# Patient Record
Sex: MALE | Race: WHITE | NOT HISPANIC OR LATINO | URBAN - METROPOLITAN AREA
[De-identification: names, ages, dates, MRNs, and addresses within clinical notes are randomized per-mention and may not be internally consistent; named-entity substitution may affect disease eponyms.]

---

## 2023-08-29 ENCOUNTER — OFFICE VISIT (OUTPATIENT)
Dept: URGENT CARE | Facility: CLINIC | Age: 48
End: 2023-08-29

## 2023-08-29 VITALS
RESPIRATION RATE: 18 BRPM | SYSTOLIC BLOOD PRESSURE: 124 MMHG | DIASTOLIC BLOOD PRESSURE: 71 MMHG | BODY MASS INDEX: 29.03 KG/M2 | HEART RATE: 79 BPM | OXYGEN SATURATION: 97 % | HEIGHT: 67 IN | WEIGHT: 185 LBS | TEMPERATURE: 99 F

## 2023-08-29 DIAGNOSIS — S29.012A MUSCLE STRAIN OF RIGHT UPPER BACK, INITIAL ENCOUNTER: Primary | ICD-10-CM

## 2023-08-29 DIAGNOSIS — M54.10 RADICULOPATHY, UNSPECIFIED SPINAL REGION: ICD-10-CM

## 2023-08-29 PROCEDURE — 96372 PR INJECTION,THERAP/PROPH/DIAG2ST, IM OR SUBCUT: ICD-10-PCS | Mod: TIER,S$GLB,,

## 2023-08-29 PROCEDURE — 99204 PR OFFICE/OUTPT VISIT, NEW, LEVL IV, 45-59 MIN: ICD-10-PCS | Mod: TIER,25,S$GLB,

## 2023-08-29 PROCEDURE — 96372 THER/PROPH/DIAG INJ SC/IM: CPT | Mod: TIER,S$GLB,,

## 2023-08-29 PROCEDURE — 99204 OFFICE O/P NEW MOD 45 MIN: CPT | Mod: TIER,25,S$GLB,

## 2023-08-29 PROCEDURE — 72040 X-RAY EXAM NECK SPINE 2-3 VW: CPT | Mod: TIER,S$GLB,, | Performed by: RADIOLOGY

## 2023-08-29 PROCEDURE — 72040 XR CERVICAL SPINE 2 OR 3 VIEWS: ICD-10-PCS | Mod: TIER,S$GLB,, | Performed by: RADIOLOGY

## 2023-08-29 RX ORDER — KETOROLAC TROMETHAMINE 30 MG/ML
30 INJECTION, SOLUTION INTRAMUSCULAR; INTRAVENOUS
Status: COMPLETED | OUTPATIENT
Start: 2023-08-29 | End: 2023-08-29

## 2023-08-29 RX ORDER — CYCLOBENZAPRINE HCL 10 MG
10 TABLET ORAL 3 TIMES DAILY PRN
Qty: 28 TABLET | Refills: 0 | Status: SHIPPED | OUTPATIENT
Start: 2023-08-29 | End: 2023-09-08

## 2023-08-29 RX ADMIN — KETOROLAC TROMETHAMINE 30 MG: 30 INJECTION, SOLUTION INTRAMUSCULAR; INTRAVENOUS at 12:08

## 2023-08-29 NOTE — PROGRESS NOTES
"Subjective:      Patient ID: Keon Rowland is a 48 y.o. male.    Vitals:  height is 5' 6.5" (1.689 m) and weight is 83.9 kg (185 lb). His oral temperature is 98.5 °F (36.9 °C). His blood pressure is 124/71 and his pulse is 79. His respiration is 18 and oxygen saturation is 97%.     Chief Complaint: Injury (Piece of hotel ceiling fell on me yesterday in [place] - Entered by patient) and Back Pain    Patient had an accident with ceiling tile falling down and hitting him yesterday. Patient upper/lower back pain and the right shoulder. The patient has been experiencing tingling and numbness in his right hand. Patient took 1 10mg flexeril tablet from a friend about 12am this morning which has helped wit symptoms. States that the pain starts from the right side of the neck and then radiates down the right arm. Experiencing tingling and numbness over the whole right arm. Worse with movements of the neck.     Injury  This is a new problem. The current episode started yesterday. The problem occurs constantly. The problem has been gradually worsening. Associated symptoms include numbness. Pertinent negatives include no congestion, diaphoresis or sore throat.   Back Pain  This is a new problem. The current episode started yesterday. The problem occurs constantly. The problem has been gradually worsening since onset. The pain is present in the thoracic spine and lumbar spine. The pain is at a severity of 7/10. The pain is The same all the time. Associated symptoms include numbness and tingling.       Constitution: Negative for sweating.   HENT:  Negative for congestion, sinus pressure and sore throat.    Musculoskeletal:  Positive for back pain.   Skin:  Negative for hives.   Allergic/Immunologic: Negative for hives and itching.   Neurological:  Positive for numbness and tingling. Negative for disorientation and altered mental status.   Psychiatric/Behavioral:  Negative for altered mental status and disorientation.     "   Objective:     Physical Exam   Constitutional: He is oriented to person, place, and time. He appears well-developed. He is cooperative.   HENT:   Head: Normocephalic and atraumatic.   Ears:   Right Ear: Hearing and external ear normal.   Left Ear: Hearing and external ear normal.   Nose: Nose normal. No mucosal edema or nasal deformity. No epistaxis. Right sinus exhibits no maxillary sinus tenderness and no frontal sinus tenderness. Left sinus exhibits no maxillary sinus tenderness and no frontal sinus tenderness.   Mouth/Throat: Uvula is midline, oropharynx is clear and moist and mucous membranes are normal. No trismus in the jaw. Normal dentition. No uvula swelling.   Eyes: Conjunctivae and lids are normal.   Neck: Trachea normal and phonation normal. Neck supple. No crepitus. There are signs of injury. No torticollis present. No edema present. No erythema present. No neck rigidity present. No decreased range of motion present. pain with movement present.   Cardiovascular: Normal rate, regular rhythm and normal pulses.   Pulmonary/Chest: Effort normal.   Abdominal: Normal appearance and bowel sounds are normal. Soft.   Musculoskeletal: Normal range of motion.         General: Normal range of motion.      Cervical back: He exhibits tenderness and spasm. He exhibits no bony tenderness and no laceration.        Back:       Comments: Pain to the right side of the neck with palpation. Skin is warm, dry and intact. No bruising or swelling noted. Full ROM of the neck with pain on lateral bending and extension of the neck. 4/5 strength in the right upper extremity compared to the left. Radial pulses present bilaterally and are equal. Cap refill < 2 sec. Skin over the hand is warm, dry and intact. No pallor.    Neurological: He is alert and oriented to person, place, and time. He exhibits normal muscle tone.   Skin: Skin is warm, dry and intact.   Psychiatric: His speech is normal and behavior is normal. Judgment and  thought content normal.   Nursing note and vitals reviewed.    XR Cervical Spine 2 or 3 Views    Result Date: 8/29/2023  EXAMINATION: XR CERVICAL SPINE 2 OR 3 VIEWS CLINICAL HISTORY: Radiculopathy, site unspecified FINDINGS: Cervical spine three views: Odontoid prevertebral soft tissues and posterior elements are intact.  Alignment is normal.  No acute fracture dislocation bone destruction seen.  No trauma seen.     No significant abnormality seen. Electronically signed by: Saud Kruger MD Date:    08/29/2023 Time:    12:25     Assessment:     1. Muscle strain of right upper back, initial encounter    2. Radiculopathy, unspecified spinal region        Plan:       Muscle strain of right upper back, initial encounter  -     Ambulatory referral/consult to Orthopedics  -     cyclobenzaprine (FLEXERIL) 10 MG tablet; Take 1 tablet (10 mg total) by mouth 3 (three) times daily as needed for Muscle spasms.  Dispense: 28 tablet; Refill: 0  -     ketorolac injection 30 mg    Radiculopathy, unspecified spinal region  -     XR Cervical Spine 2 or 3 Views; Future; Expected date: 08/29/2023  -     Ambulatory referral/consult to Orthopedics                Patient Instructions   A referral for Orthopedics has been placed. Call [phone] to schedule an appointment. Make sure to follow up in 1-2 weeks or sooner if symptoms continue or worsen.     - Today you have received a Toradol injection. DO NOT TAKE ANY NSAIDs (Ibuprofen, Motrin, Advil, Naproxen, etc.) for 24 hours after receiving the injection.     - You have been prescribed a muscle relaxer. DO NOT operate heavy machinery while taking this medication.   Take tylenol and ibuprofen as needed for pain. You can alternate between the 2 every 4 hours. For example: take 600 mg of ibuprofen/advil and then 4 hours later you can take 1000 mg of tylenol/acetaminophen. Repeat this every 4 hours as needed for pain.     - Warm compresses to the area 3-5 times per day for 20 minutes at a  time.   - Passive range of motion exercises.     - You must understand that you have received an Urgent Care treatment only and that you may be released before all of your medical problems are known or treated.   - You, the patient, will arrange for follow up care as instructed.   - If your condition worsens or fails to improve we recommend that you receive another evaluation at the ER immediately or contact your PCP to discuss your concerns or return here.   - Follow up with your PCP or specialty clinic as directed in the next 1-2 weeks if not improved or as needed.  You can call [phone] to schedule an appointment with the appropriate provider.    If your symptoms do not improve or worsen, go to the emergency room immediately.

## 2023-08-29 NOTE — PATIENT INSTRUCTIONS
A referral for Orthopedics has been placed. Call [phone] to schedule an appointment. Make sure to follow up in 1-2 weeks or sooner if symptoms continue or worsen.     - Today you have received a Toradol injection. DO NOT TAKE ANY NSAIDs (Ibuprofen, Motrin, Advil, Naproxen, etc.) for 24 hours after receiving the injection.     - You have been prescribed a muscle relaxer. DO NOT operate heavy machinery while taking this medication.   Take tylenol and ibuprofen as needed for pain. You can alternate between the 2 every 4 hours. For example: take 600 mg of ibuprofen/advil and then 4 hours later you can take 1000 mg of tylenol/acetaminophen. Repeat this every 4 hours as needed for pain.     - Warm compresses to the area 3-5 times per day for 20 minutes at a time.   - Passive range of motion exercises.     - You must understand that you have received an Urgent Care treatment only and that you may be released before all of your medical problems are known or treated.   - You, the patient, will arrange for follow up care as instructed.   - If your condition worsens or fails to improve we recommend that you receive another evaluation at the ER immediately or contact your PCP to discuss your concerns or return here.   - Follow up with your PCP or specialty clinic as directed in the next 1-2 weeks if not improved or as needed.  You can call [phone] to schedule an appointment with the appropriate provider.    If your symptoms do not improve or worsen, go to the emergency room immediately.